# Patient Record
Sex: FEMALE | Race: WHITE | NOT HISPANIC OR LATINO | Employment: OTHER | URBAN - METROPOLITAN AREA
[De-identification: names, ages, dates, MRNs, and addresses within clinical notes are randomized per-mention and may not be internally consistent; named-entity substitution may affect disease eponyms.]

---

## 2018-12-17 ENCOUNTER — HOSPITAL ENCOUNTER (EMERGENCY)
Facility: HOSPITAL | Age: 33
Discharge: HOME | End: 2018-12-18
Attending: EMERGENCY MEDICINE
Payer: COMMERCIAL

## 2018-12-17 VITALS
SYSTOLIC BLOOD PRESSURE: 113 MMHG | WEIGHT: 95 LBS | RESPIRATION RATE: 16 BRPM | DIASTOLIC BLOOD PRESSURE: 54 MMHG | BODY MASS INDEX: 18.65 KG/M2 | HEIGHT: 60 IN | TEMPERATURE: 98 F | HEART RATE: 69 BPM | OXYGEN SATURATION: 100 %

## 2018-12-17 DIAGNOSIS — M54.12 CERVICAL RADICULOPATHY: Primary | ICD-10-CM

## 2018-12-17 LAB
B-HCG UR QL: NEGATIVE
CTP QC/QA: YES

## 2018-12-17 PROCEDURE — 99284 EMERGENCY DEPT VISIT MOD MDM: CPT | Mod: 25

## 2018-12-17 PROCEDURE — 81025 URINE PREGNANCY TEST: CPT | Performed by: NURSE PRACTITIONER

## 2018-12-17 RX ORDER — FLUCONAZOLE 100 MG/1
100 TABLET ORAL DAILY
COMMUNITY
End: 2019-11-26 | Stop reason: ALTCHOICE

## 2018-12-18 RX ORDER — CYCLOBENZAPRINE HCL 10 MG
10 TABLET ORAL 3 TIMES DAILY PRN
Qty: 15 TABLET | Refills: 0 | Status: SHIPPED | OUTPATIENT
Start: 2018-12-18 | End: 2018-12-23

## 2018-12-18 RX ORDER — DICLOFENAC SODIUM 75 MG/1
75 TABLET, DELAYED RELEASE ORAL 2 TIMES DAILY
Qty: 14 TABLET | Refills: 7 | OUTPATIENT
Start: 2018-12-18 | End: 2023-03-27

## 2018-12-18 NOTE — ED PROVIDER NOTES
"Encounter Date: 2018       History     Chief Complaint   Patient presents with    Numbness     Rt face, Neck, and shoulder x 3 weeks.  With assoicated H/A     2018  11:25 PM     Chief Complaint:     The patient is a 33 y.o well appearing female who presents with c/o right facial and neck numbness today.  Reports 2 weeks ago she woke up with right neck pain and has since had intermittent headaches with nausea.  Reports she felt a "knot" on back of right neck a few days ago.  Also c/o intermittent right arm numbness that extends to right hand. Reports occasional dizziness.Denies fever/chills/chest pain/SOB. Denies PMH. PSH includes c section and laproscopic. Denies drug allergies.            Review of patient's allergies indicates:  No Known Allergies  History reviewed. No pertinent past medical history.  Past Surgical History:   Procedure Laterality Date     SECTION       Family History   Problem Relation Age of Onset    Breast cancer Sister     Colon cancer Paternal Uncle     Colon cancer Paternal Grandmother      Social History     Tobacco Use    Smoking status: Current Every Day Smoker     Packs/day: 0.50     Years: 15.00     Pack years: 7.50    Smokeless tobacco: Never Used   Substance Use Topics    Alcohol use: No    Drug use: No     Review of Systems   Constitutional: Negative for chills and fever.   HENT: Negative for ear pain, sinus pain, sore throat, trouble swallowing and voice change.    Eyes: Negative for photophobia, pain and visual disturbance.   Respiratory: Negative for cough, chest tightness and shortness of breath.    Cardiovascular: Negative for chest pain and palpitations.   Gastrointestinal: Positive for nausea. Negative for abdominal pain, diarrhea and vomiting.   Musculoskeletal: Positive for myalgias and neck pain. Negative for back pain, gait problem, joint swelling and neck stiffness.   Skin: Negative for pallor, rash and wound.   Neurological: Positive for " dizziness, weakness, numbness and headaches. Negative for tremors, seizures, syncope, facial asymmetry, speech difficulty and light-headedness.   Hematological: Negative for adenopathy.   Psychiatric/Behavioral: The patient is not nervous/anxious.        Physical Exam     Initial Vitals [12/17/18 2303]   BP Pulse Resp Temp SpO2   (!) 113/54 69 16 98.4 °F (36.9 °C) 100 %      MAP       --         Physical Exam    Constitutional: She appears well-developed and well-nourished. She is not diaphoretic. No distress.   HENT:   Head: Normocephalic and atraumatic.   Left Ear: External ear normal.   Mouth/Throat: Oropharynx is clear and moist. No oropharyngeal exudate.   Eyes: EOM are normal. Pupils are equal, round, and reactive to light.   Neck: Normal range of motion. Neck supple. No tracheal deviation present.   Cardiovascular: Normal rate, regular rhythm, normal heart sounds and intact distal pulses. Exam reveals no gallop and no friction rub.    No murmur heard.  Pulmonary/Chest: Breath sounds normal. No respiratory distress. She has no wheezes. She has no rhonchi. She has no rales. She exhibits no tenderness.   Abdominal: Soft. Bowel sounds are normal.   Musculoskeletal: Normal range of motion. She exhibits no edema or tenderness.   Lymphadenopathy:     She has no cervical adenopathy.   Neurological: She is alert and oriented to person, place, and time. She has normal strength. No cranial nerve deficit or sensory deficit. GCS score is 15. GCS eye subscore is 4. GCS verbal subscore is 5. GCS motor subscore is 6.   Skin: Skin is warm and dry.   Psychiatric: She has a normal mood and affect.         ED Course   Procedures  Labs Reviewed   POCT URINE PREGNANCY          Imaging Results          CT Cervical Spine Without Contrast (In process)                CT Head Without Contrast (In process)                  Medical Decision Making:   History:   Old Medical Records: I decided to obtain old medical records.  Differential  Diagnosis:   Cervical radiculopathy  cva  tia  Trigeminal neuralgia     Clinical Tests:   Lab Tests: Ordered and Reviewed  The following lab test(s) were unremarkable: UPT  Radiological Study: Ordered and Reviewed       APC / Resident Notes:   33 y.o. Well appearing otherwise healthy female presents with c/o right face and neck numbness that has progressively gotten worse over past 2 weeks, reports it started as right sided neck pain. Head CT and Cervical spine CT both negative. The patient appears to have a cervical strain with radiculopathy.  I do not think the patient has a fracture, subluxation, focal unstable neurologic deficit, infectious process or other severe injury requiring further workup in the ED at this time.  The imaging shows no acute injury. I do not feel further workup or treatment is needed at this time and pt is stable for discharge. The patient will be treated with anti-inflammatories, and muscle relaxers. I have discussed pt with Dr Espinal who agrees with POC. Pt voices understanding and is agreeable to the plan.  She is given specific return precautions.                    Clinical Impression:   The encounter diagnosis was Cervical radiculopathy.                             Marcelina Roque NP  12/18/18 0055

## 2019-11-26 ENCOUNTER — OFFICE VISIT (OUTPATIENT)
Dept: URGENT CARE | Facility: CLINIC | Age: 34
End: 2019-11-26
Payer: COMMERCIAL

## 2019-11-26 VITALS
TEMPERATURE: 98 F | RESPIRATION RATE: 18 BRPM | OXYGEN SATURATION: 96 % | SYSTOLIC BLOOD PRESSURE: 93 MMHG | DIASTOLIC BLOOD PRESSURE: 57 MMHG | HEART RATE: 75 BPM | HEIGHT: 60 IN | WEIGHT: 95 LBS | BODY MASS INDEX: 18.65 KG/M2

## 2019-11-26 DIAGNOSIS — B96.89 ACUTE BACTERIAL SINUSITIS: ICD-10-CM

## 2019-11-26 DIAGNOSIS — J06.9 UPPER RESPIRATORY TRACT INFECTION, UNSPECIFIED TYPE: Primary | ICD-10-CM

## 2019-11-26 DIAGNOSIS — J06.9 URI, ACUTE: ICD-10-CM

## 2019-11-26 DIAGNOSIS — Z86.19 HISTORY OF CANDIDIASIS: ICD-10-CM

## 2019-11-26 DIAGNOSIS — J01.90 ACUTE BACTERIAL SINUSITIS: ICD-10-CM

## 2019-11-26 DIAGNOSIS — S13.9XXA CERVICAL SPRAIN, INITIAL ENCOUNTER: ICD-10-CM

## 2019-11-26 LAB
CTP QC/QA: YES
FLUAV AG NPH QL: NEGATIVE
FLUBV AG NPH QL: NEGATIVE

## 2019-11-26 PROCEDURE — 96372 THER/PROPH/DIAG INJ SC/IM: CPT | Mod: S$GLB,,, | Performed by: FAMILY MEDICINE

## 2019-11-26 PROCEDURE — 96372 PR INJECTION,THERAP/PROPH/DIAG2ST, IM OR SUBCUT: ICD-10-PCS | Mod: S$GLB,,, | Performed by: FAMILY MEDICINE

## 2019-11-26 PROCEDURE — 99203 PR OFFICE/OUTPT VISIT, NEW, LEVL III, 30-44 MIN: ICD-10-PCS | Mod: 25,S$GLB,, | Performed by: FAMILY MEDICINE

## 2019-11-26 PROCEDURE — 87804 INFLUENZA ASSAY W/OPTIC: CPT | Mod: QW,S$GLB,, | Performed by: FAMILY MEDICINE

## 2019-11-26 PROCEDURE — 3008F BODY MASS INDEX DOCD: CPT | Mod: CPTII,S$GLB,, | Performed by: FAMILY MEDICINE

## 2019-11-26 PROCEDURE — 99203 OFFICE O/P NEW LOW 30 MIN: CPT | Mod: 25,S$GLB,, | Performed by: FAMILY MEDICINE

## 2019-11-26 PROCEDURE — 87804 POCT INFLUENZA A/B: ICD-10-PCS | Mod: QW,S$GLB,, | Performed by: FAMILY MEDICINE

## 2019-11-26 PROCEDURE — 3008F PR BODY MASS INDEX (BMI) DOCUMENTED: ICD-10-PCS | Mod: CPTII,S$GLB,, | Performed by: FAMILY MEDICINE

## 2019-11-26 RX ORDER — CYCLOBENZAPRINE HCL 5 MG
5 TABLET ORAL NIGHTLY
Qty: 7 TABLET | Refills: 0 | Status: SHIPPED | OUTPATIENT
Start: 2019-11-26 | End: 2019-12-03

## 2019-11-26 RX ORDER — LORATADINE 10 MG/1
10 TABLET ORAL DAILY
Qty: 30 TABLET | Refills: 2 | Status: SHIPPED | OUTPATIENT
Start: 2019-11-26 | End: 2020-11-25

## 2019-11-26 RX ORDER — BETAMETHASONE SODIUM PHOSPHATE AND BETAMETHASONE ACETATE 3; 3 MG/ML; MG/ML
6 INJECTION, SUSPENSION INTRA-ARTICULAR; INTRALESIONAL; INTRAMUSCULAR; SOFT TISSUE
Status: COMPLETED | OUTPATIENT
Start: 2019-11-26 | End: 2019-11-26

## 2019-11-26 RX ORDER — FLUCONAZOLE 150 MG/1
150 TABLET ORAL DAILY
Qty: 1 TABLET | Refills: 0 | Status: SHIPPED | OUTPATIENT
Start: 2019-11-26 | End: 2019-11-27

## 2019-11-26 RX ORDER — FLUTICASONE PROPIONATE 50 MCG
1 SPRAY, SUSPENSION (ML) NASAL DAILY
Qty: 1 BOTTLE | Refills: 0 | OUTPATIENT
Start: 2019-11-26 | End: 2023-03-27

## 2019-11-26 RX ORDER — AMOXICILLIN 875 MG/1
875 TABLET, FILM COATED ORAL 2 TIMES DAILY
Qty: 20 TABLET | Refills: 0 | Status: SHIPPED | OUTPATIENT
Start: 2019-11-29 | End: 2019-12-09

## 2019-11-26 RX ADMIN — BETAMETHASONE SODIUM PHOSPHATE AND BETAMETHASONE ACETATE 6 MG: 3; 3 INJECTION, SUSPENSION INTRA-ARTICULAR; INTRALESIONAL; INTRAMUSCULAR; SOFT TISSUE at 10:11

## 2019-11-26 NOTE — PROGRESS NOTES
Subjective:       Patient ID: Yael Mills is a 34 y.o. female.    Vitals:  height is 5' (1.524 m) and weight is 43.1 kg (95 lb). Her temperature is 98.4 °F (36.9 °C). Her blood pressure is 93/57 (abnormal) and her pulse is 75. Her respiration is 18 and oxygen saturation is 96%.     Chief Complaint: URI    Pt said her symptoms started 2 days ago.  Patient complains of having sore throat sinus congestion for 2 days no fever no chills also complains of left-sided neck pain    URI    This is a new problem. The current episode started in the past 7 days. The problem has been gradually worsening. There has been no fever. Associated symptoms include congestion, coughing, ear pain, headaches and neck pain. Pertinent negatives include no nausea, rash, sinus pain, sore throat, vomiting or wheezing. She has tried acetaminophen for the symptoms. The treatment provided mild relief.       Constitution: Negative for chills, sweating, fatigue and fever.   HENT: Positive for ear pain and congestion. Negative for sinus pain, sinus pressure, sore throat and voice change.    Neck: Positive for neck pain. Negative for painful lymph nodes.   Eyes: Negative for eye redness.   Respiratory: Positive for cough and sputum production. Negative for chest tightness, bloody sputum, COPD, shortness of breath, stridor, wheezing and asthma.    Gastrointestinal: Negative for nausea and vomiting.   Musculoskeletal: Negative for muscle ache.   Skin: Negative for rash.   Allergic/Immunologic: Negative for seasonal allergies and asthma.   Neurological: Positive for headaches.   Hematologic/Lymphatic: Negative for swollen lymph nodes.       Objective:      Physical Exam   Constitutional: She is oriented to person, place, and time. She appears well-developed and well-nourished. She is cooperative.  Non-toxic appearance. She does not appear ill. No distress.   HENT:   Head: Normocephalic and atraumatic.   Right Ear: Hearing, tympanic membrane, external  ear and ear canal normal.   Left Ear: Hearing, tympanic membrane, external ear and ear canal normal.   Nose: Nose normal. No mucosal edema, rhinorrhea or nasal deformity. No epistaxis. Right sinus exhibits no maxillary sinus tenderness and no frontal sinus tenderness. Left sinus exhibits no maxillary sinus tenderness and no frontal sinus tenderness.   Mouth/Throat: Uvula is midline, oropharynx is clear and moist and mucous membranes are normal. No trismus in the jaw. Normal dentition. No uvula swelling. No posterior oropharyngeal erythema.   Throat is clear TMs normal no lymphadenopathy   Eyes: Conjunctivae and lids are normal. Right eye exhibits no discharge. Left eye exhibits no discharge. No scleral icterus.   Neck: Trachea normal, normal range of motion, full passive range of motion without pain and phonation normal. Neck supple.   Left trapezius muscle slight tenderness otherwise slight decreased restricted neck movement laterally to the left   Cardiovascular: Normal rate, regular rhythm, normal heart sounds, intact distal pulses and normal pulses.   Pulmonary/Chest: Effort normal and breath sounds normal. No respiratory distress.   Abdominal: Soft. Normal appearance and bowel sounds are normal. She exhibits no distension, no pulsatile midline mass and no mass. There is no tenderness.   Musculoskeletal: Normal range of motion. She exhibits no edema or deformity.   Neurological: She is alert and oriented to person, place, and time. She exhibits normal muscle tone. Coordination normal.   Skin: Skin is warm, dry, intact, not diaphoretic and not pale.   Psychiatric: She has a normal mood and affect. Her speech is normal and behavior is normal. Judgment and thought content normal. Cognition and memory are normal.   Nursing note and vitals reviewed.        Assessment:       1. Upper respiratory tract infection, unspecified type    2. URI, acute    3. Cervical sprain, initial encounter    4. Acute bacterial sinusitis     5. History of candidiasis        Plan:       Patient Instructions   Please drink plenty of fluids.  Please get plenty of rest.  Please return here or go to the Emergency Department for any concerns or worsening of condition.     If you were given wait & see antibiotics, please wait 5-7 days before taking them, and only take them if your symptoms have worsened or not improved.  If you do begin taking the antibiotics, please take them to completion.    Upper respiratory tract infection, unspecified type  -     POCT Influenza A/B    URI, acute    Cervical sprain, initial encounter  -     cyclobenzaprine (FLEXERIL) 5 MG tablet; Take 1 tablet (5 mg total) by mouth nightly. for 7 doses  Dispense: 7 tablet; Refill: 0    Acute bacterial sinusitis  -     fluticasone propionate (FLONASE) 50 mcg/actuation nasal spray; 1 spray (50 mcg total) by Each Nostril route once daily.  Dispense: 1 Bottle; Refill: 0  -     amoxicillin (AMOXIL) 875 MG tablet; Take 1 tablet (875 mg total) by mouth 2 (two) times daily. for 10 days  Dispense: 20 tablet; Refill: 0    History of candidiasis  -     fluconazole (DIFLUCAN) 150 MG Tab; Take 1 tablet (150 mg total) by mouth once daily. for 1 day  Dispense: 1 tablet; Refill: 0    Other orders  -     loratadine (CLARITIN) 10 mg tablet; Take 1 tablet (10 mg total) by mouth once daily.  Dispense: 30 tablet; Refill: 2  -     betamethasone acetate-betamethasone sodium phosphate injection 6 mg        after the exam patient was told that she has allergies at at that she got very displeased and started saying that vinegar treat her for her sinus infection and this is not allergies and she never had allergies before even I finished telling her that what she is goi ng to be treated with    Pt was advised of this, was not happy, wanting ABX    Results for orders placed or performed in visit on 11/26/19   POCT Influenza A/B   Result Value Ref Range    Rapid Influenza A Ag Negative Negative    Rapid Influenza B  Ag Negative Negative     Acceptable Yes

## 2020-04-02 ENCOUNTER — NURSE TRIAGE (OUTPATIENT)
Dept: ADMINISTRATIVE | Facility: CLINIC | Age: 35
End: 2020-04-02

## 2020-04-02 NOTE — TELEPHONE ENCOUNTER
Low grade temp. Highest 100.3. OAC info provided.    Reason for Disposition   Sore throat    Additional Information   Negative: Difficulty breathing (per caller) but not severe   Negative: Fever > 103 F (39.4 C)   Negative: Refuses to drink anything for > 12 hours   Negative: Drooling or spitting out saliva (because can't swallow)   Negative: Unable to open mouth completely   Negative: Drinking very little and has signs of dehydration (e.g., no urine > 12 hours, very dry mouth, very lightheaded)   Negative: Patient sounds very sick or weak to the triager   Negative: Throat culture results, call about   Negative: Productive cough is the main symptom   Negative: Runny nose is the main symptom   Negative: Severe difficulty breathing (e.g., struggling for each breath, speaks in single words)   Negative: Sounds like a life-threatening emergency to the triager   Negative: Patient wants to be seen   Negative: SEVERE sore throat pain   Negative: Pus on tonsils (back of throat) and swollen neck lymph nodes ('glands')   Negative: Earache also present   Negative: Widespread rash (especially chest and abdomen)   Negative: Diabetes mellitus or weak immune system (e.g., HIV positive, cancer chemo, splenectomy, organ transplant, chronic steroids)   Negative: History of rheumatic fever   Negative: Patient requesting a strep throat test   Negative: Strep exposure within last 10 days   Negative: Sore throat is the main symptom and persists > 48 hours   Negative: Sore throat with cough/cold symptoms present > 5 days   Negative: Fever present > 3 days (72 hours)    Protocols used: SORE THROAT-A-OH

## 2020-07-05 ENCOUNTER — HOSPITAL ENCOUNTER (EMERGENCY)
Facility: HOSPITAL | Age: 35
Discharge: HOME | End: 2020-07-05
Attending: EMERGENCY MEDICINE
Payer: COMMERCIAL

## 2020-07-05 VITALS
RESPIRATION RATE: 20 BRPM | OXYGEN SATURATION: 100 % | DIASTOLIC BLOOD PRESSURE: 76 MMHG | HEART RATE: 80 BPM | SYSTOLIC BLOOD PRESSURE: 112 MMHG | TEMPERATURE: 99 F

## 2020-07-05 DIAGNOSIS — Z20.822 SUSPECTED COVID-19 VIRUS INFECTION: Primary | ICD-10-CM

## 2020-07-05 PROCEDURE — U0003 INFECTIOUS AGENT DETECTION BY NUCLEIC ACID (DNA OR RNA); SEVERE ACUTE RESPIRATORY SYNDROME CORONAVIRUS 2 (SARS-COV-2) (CORONAVIRUS DISEASE [COVID-19]), AMPLIFIED PROBE TECHNIQUE, MAKING USE OF HIGH THROUGHPUT TECHNOLOGIES AS DESCRIBED BY CMS-2020-01-R: HCPCS

## 2020-07-05 PROCEDURE — 99283 EMERGENCY DEPT VISIT LOW MDM: CPT

## 2020-07-05 RX ORDER — ALBUTEROL SULFATE 90 UG/1
1-2 AEROSOL, METERED RESPIRATORY (INHALATION) EVERY 6 HOURS PRN
Qty: 18 G | Refills: 0 | Status: SHIPPED | OUTPATIENT
Start: 2020-07-05 | End: 2021-07-31 | Stop reason: SDUPTHER

## 2020-07-05 RX ORDER — ALBUTEROL SULFATE 90 UG/1
1-2 AEROSOL, METERED RESPIRATORY (INHALATION) EVERY 6 HOURS PRN
Qty: 18 G | Refills: 0 | Status: SHIPPED | OUTPATIENT
Start: 2020-07-05 | End: 2021-07-05

## 2020-07-05 NOTE — ED PROVIDER NOTES
Encounter Date: 2020       History     Chief Complaint   Patient presents with    COVID-19 Concerns     son + for COVID 2 days ago; reports cough, fatigue, sore throat; denies fever     Patient presents the emergency room for evaluation of mild cough fatigue sore throat.  Her son is positive for COVID 2 days ago.  Her son's father and family members were positive for coronavirus.  No other complaints at this time she is a smoker.        Review of patient's allergies indicates:  No Known Allergies  No past medical history on file.  Past Surgical History:   Procedure Laterality Date     SECTION       Family History   Problem Relation Age of Onset    Breast cancer Sister     Colon cancer Paternal Uncle     Colon cancer Paternal Grandmother      Social History     Tobacco Use    Smoking status: Current Every Day Smoker     Packs/day: 0.50     Years: 15.00     Pack years: 7.50    Smokeless tobacco: Never Used   Substance Use Topics    Alcohol use: No    Drug use: No     Review of Systems   Constitutional: Negative for fever.   HENT: Positive for sore throat. Negative for ear pain and rhinorrhea.    Eyes: Negative for pain and visual disturbance.   Respiratory: Positive for cough, shortness of breath and wheezing.    Cardiovascular: Negative for chest pain.   Gastrointestinal: Negative for abdominal pain, diarrhea, nausea and vomiting.   Genitourinary: Negative for difficulty urinating and dysuria.   Musculoskeletal: Negative for arthralgias.   Skin: Negative for rash.   Neurological: Negative for weakness, light-headedness, numbness and headaches.   Psychiatric/Behavioral: Negative for agitation and confusion.   All other systems reviewed and are negative.      Physical Exam     Initial Vitals [20 1522]   BP Pulse Resp Temp SpO2   98/61 76 20 99 °F (37.2 °C) 100 %      MAP       --         Physical Exam    Nursing note and vitals reviewed.  Constitutional: She appears well-developed and  well-nourished.  Non-toxic appearance. No distress.   Nontoxic, well appearing female.    HENT:   Head: Normocephalic and atraumatic.   Mouth/Throat: Oropharynx is clear and moist.   Eyes: EOM are normal. Pupils are equal, round, and reactive to light.   Neck: Neck supple.   Cardiovascular: Normal rate, regular rhythm, normal heart sounds and intact distal pulses. Exam reveals no gallop and no friction rub.    No murmur heard.  Pulmonary/Chest: No respiratory distress. She has no decreased breath sounds. She has wheezes. She has no rhonchi. She has no rales.   Abdominal: Soft. Bowel sounds are normal. She exhibits no distension. There is no abdominal tenderness.   Musculoskeletal: Normal range of motion.   Neurological: She is alert and oriented to person, place, and time. She has normal strength. No cranial nerve deficit. GCS score is 15. GCS eye subscore is 4. GCS verbal subscore is 5. GCS motor subscore is 6.   Skin: Skin is warm and dry.   Psychiatric: She has a normal mood and affect.         ED Course   Procedures  Labs Reviewed   SARS-COV-2 (COVID-19) QUALITATIVE PCR          Imaging Results    None          Medical Decision Making:   Clinical Tests:   Lab Tests: Reviewed  Patient will be tested for coronavirus.  She does not appear to be significantly ill.  I do not think she needs lab work admission at this time.  She is slightly wheezing and will give albuterol.  For discharge.                                 Clinical Impression:       ICD-10-CM ICD-9-CM   1. Suspected Covid-19 Virus Infection  R68.89                                 Andre Diaz MD  07/05/20 2026

## 2020-07-05 NOTE — Clinical Note
"Yael"Gabriela Mills was seen and treated in our emergency department on 7/5/2020.     COVID-19 is present in our communities across the state. There is limited testing for COVID at this time, so not all patients can be tested. In this situation, your employee meets the following criteria:    Yael Mills has met the criteria for COVID-19 testing based upon symptoms, travel, and/or potential exposure. The test has been completed and is pending results at this time. During this time the employee is not able to work and should be quarantined per the Centers for Disease Control timelines.     If you have any questions or concerns, or if I can be of further assistance, please do not hesitate to contact me.    Sincerely,             Andre Diaz MD"

## 2020-07-05 NOTE — ED NOTES
Assumed care:  Yael Mills is awake, alert and oriented x 3, skin warm and dry, in NAD.  Patient states that her children, , inlaws all tested positive for Covid.  Patient states that she started with dry cough and body aches yesterday.    Patient identifiers for Yael Mills checked and correct.  LOC:  Yael Mills is awake, alert, and aware of environment with an appropriate affect. She is oriented x 3 and speaking appropriately.  APPEARANCE:  She is resting comfortably and in no acute distress. She is clean and well groomed, patient's clothing is properly fastened.  SKIN:  The skin is warm and dry. She has normal skin turgor and moist mucus membranes. Skin is intact; no bruising or breakdown noted.  MUSCULOSKELETAL:  She is moving all extremities well, no obvious deformities noted. Pulses intact.   RESPIRATORY:  Airway is open and patent. Respirations are spontaneous and non-labored with normal effort and rate.  Dry cough  CARDIAC:  She has a normal rate and rhythm. No peripheral edema noted. Capillary refill < 3 seconds.  ABDOMEN:  No distention noted.  Soft and non-tender upon palpation.  NEUROLOGICAL:  PERRL. Facial expression is symmetrical. Hand grasps are equal bilaterally. Normal sensation in all extremities when touched with finger.  Allergies reported:  Review of patient's allergies indicates:  No Known Allergies  OTHER NOTES:

## 2020-07-06 LAB — SARS-COV-2 RNA RESP QL NAA+PROBE: NOT DETECTED

## 2021-04-26 ENCOUNTER — PATIENT MESSAGE (OUTPATIENT)
Dept: RESEARCH | Facility: HOSPITAL | Age: 36
End: 2021-04-26

## 2021-07-31 ENCOUNTER — HOSPITAL ENCOUNTER (EMERGENCY)
Facility: HOSPITAL | Age: 36
Discharge: HOME | End: 2021-07-31
Attending: EMERGENCY MEDICINE
Payer: COMMERCIAL

## 2021-07-31 VITALS
TEMPERATURE: 99 F | DIASTOLIC BLOOD PRESSURE: 63 MMHG | BODY MASS INDEX: 23.18 KG/M2 | RESPIRATION RATE: 18 BRPM | OXYGEN SATURATION: 100 % | WEIGHT: 115 LBS | HEIGHT: 59 IN | HEART RATE: 72 BPM | SYSTOLIC BLOOD PRESSURE: 102 MMHG

## 2021-07-31 DIAGNOSIS — Z20.822 LAB TEST NEGATIVE FOR COVID-19 VIRUS: ICD-10-CM

## 2021-07-31 DIAGNOSIS — J06.9 VIRAL URI WITH COUGH: Primary | ICD-10-CM

## 2021-07-31 LAB — SARS-COV-2 RDRP RESP QL NAA+PROBE: NEGATIVE

## 2021-07-31 PROCEDURE — 99283 EMERGENCY DEPT VISIT LOW MDM: CPT

## 2021-07-31 PROCEDURE — U0002 COVID-19 LAB TEST NON-CDC: HCPCS | Performed by: PHYSICIAN ASSISTANT

## 2021-07-31 RX ORDER — ALBUTEROL SULFATE 90 UG/1
1-2 AEROSOL, METERED RESPIRATORY (INHALATION) EVERY 6 HOURS PRN
Qty: 18 G | Refills: 0 | Status: SHIPPED | OUTPATIENT
Start: 2021-07-31 | End: 2022-07-31

## 2022-04-01 ENCOUNTER — HOSPITAL ENCOUNTER (EMERGENCY)
Facility: HOSPITAL | Age: 37
Discharge: HOME | End: 2022-04-01
Attending: EMERGENCY MEDICINE
Payer: COMMERCIAL

## 2022-04-01 VITALS
TEMPERATURE: 98 F | BODY MASS INDEX: 22.97 KG/M2 | SYSTOLIC BLOOD PRESSURE: 116 MMHG | HEART RATE: 83 BPM | HEIGHT: 60 IN | OXYGEN SATURATION: 99 % | DIASTOLIC BLOOD PRESSURE: 69 MMHG | RESPIRATION RATE: 18 BRPM | WEIGHT: 117 LBS

## 2022-04-01 DIAGNOSIS — R07.89 XIPHOID PAIN: ICD-10-CM

## 2022-04-01 DIAGNOSIS — R07.89 CHEST WALL PAIN: ICD-10-CM

## 2022-04-01 PROCEDURE — 93005 ELECTROCARDIOGRAM TRACING: CPT

## 2022-04-01 PROCEDURE — 99284 EMERGENCY DEPT VISIT MOD MDM: CPT | Mod: 25

## 2022-04-01 PROCEDURE — 25000003 PHARM REV CODE 250: Performed by: EMERGENCY MEDICINE

## 2022-04-01 PROCEDURE — 93010 EKG 12-LEAD: ICD-10-PCS | Mod: ,,, | Performed by: GENERAL PRACTICE

## 2022-04-01 PROCEDURE — 93010 ELECTROCARDIOGRAM REPORT: CPT | Mod: ,,, | Performed by: GENERAL PRACTICE

## 2022-04-01 RX ORDER — ACETAMINOPHEN 500 MG
1000 TABLET ORAL
Status: COMPLETED | OUTPATIENT
Start: 2022-04-01 | End: 2022-04-01

## 2022-04-01 RX ORDER — IBUPROFEN 400 MG/1
800 TABLET ORAL
Status: COMPLETED | OUTPATIENT
Start: 2022-04-01 | End: 2022-04-01

## 2022-04-01 RX ADMIN — ACETAMINOPHEN 1000 MG: 500 TABLET ORAL at 02:04

## 2022-04-01 RX ADMIN — IBUPROFEN 800 MG: 400 TABLET, FILM COATED ORAL at 02:04

## 2022-04-01 NOTE — FIRST PROVIDER EVALUATION
Emergency Department TeleTriage Encounter Note      CHIEF COMPLAINT    Chief Complaint   Patient presents with    Lump to chest     Pt to ER with c/o lump to epigastric area x 1 week. Pt reports pain to area radiating up towards chest. + weakness        VITAL SIGNS   Initial Vitals [04/01/22 1252]   BP Pulse Resp Temp SpO2   113/72 73 18 98.4 °F (36.9 °C) 100 %      MAP       --            ALLERGIES    Review of patient's allergies indicates:  No Known Allergies    PROVIDER TRIAGE NOTE  This is a teletriage evaluation of a 36 y.o. female presenting to the ED with c/o lump to lower sternum for the past week. Pt states today she started with chest pain and back pain.  1 episode of vomiting earlier today.  Unsure if she is pregnant.   .     PE: Nontender lump to chest.  Burning around site.  VSS.  NAD noted.  Speaking in full sentences    Plan: imaging, urine    All ED beds are full at present; patient notified of this status.  Patient seen and medically screened by Nurse Practitioner via teletriage. Orders initiated at triage to expedite care.  Patient is stable and will be placed in an ED bed when available.  Care will be transferred to an alternate provider when patient has been placed in an Exam Room further exam, additional orders, and disposition.          ORDERS  Labs Reviewed   PREGNANCY TEST, URINE RAPID       ED Orders (720h ago, onward)    Start Ordered     Status Ordering Provider    04/01/22 1259 04/01/22 1258  Pregnancy, urine rapid  STAT         Ordered REYNA JIMENEZ    04/01/22 1258 04/01/22 1258  X-Ray Chest 1 View  1 time imaging         Ordered REYNA JIMENEZ            Virtual Visit Note: The provider triage portion of this emergency department evaluation and documentation was performed via [employer], a HIPAA-compliant telemedicine application, in concert with a tele-presenter in the room. A face to face patient evaluation with one of my colleagues will occur once the patient is placed in  an emergency department room.      DISCLAIMER: This note was prepared with [employer] voice recognition transcription software. Garbled syntax, mangled pronouns, and other bizarre constructions may be attributed to that software system.

## 2022-04-01 NOTE — ED PROVIDER NOTES
Encounter Date: 2022    SCRIBE #1 NOTE: I, Shauna Soni, am scribing for, and in the presence of, Orlin Anne MD.       History     Chief Complaint   Patient presents with    Lump to chest     Pt to ER with c/o lump to epigastric area x 1 week. Pt reports pain to area radiating up towards chest. + weakness      Time seen by provider: 2:34 PM on 2022    Yael Mills is a 36 y.o. female who presents to the ED with an onset of a painful lump to her mid upper abdomen that she noticed last Thursday (3/24) with some increased pain today that became worse after eating. She reports after eating she started having some burning in her chest. She also endorses vomiting once this morning. She states she can feel the lump when she breathes. She denies any major health problems or daily medications. The patient denies any other symptoms at this time. PSHx of breast implants and .      The history is provided by the patient.     Review of patient's allergies indicates:  No Known Allergies  No past medical history on file.  Past Surgical History:   Procedure Laterality Date     SECTION       Family History   Problem Relation Age of Onset    Breast cancer Sister     Colon cancer Paternal Uncle     Colon cancer Paternal Grandmother      Social History     Tobacco Use    Smoking status: Current Every Day Smoker     Packs/day: 0.50     Years: 15.00     Pack years: 7.50    Smokeless tobacco: Never Used   Substance Use Topics    Alcohol use: No    Drug use: No     Review of Systems   Constitutional: Negative.    Respiratory: Negative for shortness of breath.    Cardiovascular: Positive for chest pain.   Gastrointestinal: Positive for abdominal pain and vomiting.       Physical Exam     Initial Vitals [22 1252]   BP Pulse Resp Temp SpO2   113/72 73 18 98.4 °F (36.9 °C) 100 %      MAP       --         Physical Exam    Nursing note and vitals reviewed.  Constitutional: She appears  well-developed and well-nourished.   HENT:   Head: Normocephalic and atraumatic.   Eyes: EOM are normal.   Neck: Neck supple.   Normal range of motion.  Cardiovascular: Normal rate, regular rhythm and normal heart sounds. Exam reveals no gallop and no friction rub.    No murmur heard.  Pulmonary/Chest: Breath sounds normal. No respiratory distress. She has no wheezes. She has no rhonchi. She has no rales.   Point tenderness to the xyphoid.   Musculoskeletal:         General: Normal range of motion.      Cervical back: Normal range of motion and neck supple.     Neurological: She is alert and oriented to person, place, and time.   Skin: Skin is warm and dry.   Psychiatric: She has a normal mood and affect. Her behavior is normal. Judgment and thought content normal.         ED Course   Procedures  Labs Reviewed   PREGNANCY TEST, URINE RAPID          Imaging Results          X-Ray Chest 1 View (Final result)  Result time 04/01/22 13:52:04    Final result by Ivon Corbett MD (04/01/22 13:52:04)                 Impression:      No acute cardiopulmonary disease.      Electronically signed by: Ivon Corbett MD  Date:    04/01/2022  Time:    13:52             Narrative:    EXAMINATION:  XR CHEST 1 VIEW    CLINICAL HISTORY:  Other chest pain    TECHNIQUE:  Single frontal view of the chest was performed.    COMPARISON:  None    FINDINGS:  The heart is not enlarged.  The lungs are expanded and clear.  The costophrenic sulci are sharp.  The bony thorax to the extent visualized on the routine single view of the chest appears intact.                                 Medications   ibuprofen tablet 800 mg (800 mg Oral Given 4/1/22 1455)   acetaminophen tablet 1,000 mg (1,000 mg Oral Given 4/1/22 1455)     Medical Decision Making:   History:   Old Medical Records: I decided to obtain old medical records.  Clinical Tests:   Lab Tests: Ordered and Reviewed  Radiological Study: Ordered and Reviewed          Scribe Attestation:  "  Scribe #1: I performed the above scribed service and the documentation accurately describes the services I performed. I attest to the accuracy of the note.        ED Course as of 04/01/22 1642 Fri Apr 01, 2022   1409 X-Ray Chest 1 View [EF]   1409 SpO2: 100 % [EF]   1409 Resp: 18 [EF]   1409 Pulse: 73 [EF]   1409 Temp src: Oral [EF]   1409 Temp: 98.4 °F (36.9 °C) [EF]   1409 BP: 113/72 [EF]   1504 EKG sinus rhythm 71 beats per minute normal axis no ST elevation or depression no T-wave inversion independently interpreted [EF]   1532 RN reports patient eloped ER without informing staff [EF]      ED Course User Index  [EF] Orlin Anne MD           I, Dr. Anne, personally performed the services described in this documentation. All medical record entries made by the scribe were at my direction and in my presence.  I have reviewed the chart and agree that the record reflects my personal performance and is accurate and complete.4:42 PM 04/01/2022      Clinical Impression:   Final diagnoses:  [R07.89] Chest wall pain  [R07.89] Xiphoid pain          ED Disposition Condition    Eloped               36-year-old presents to the ER with "a lump" in her upper abdomen.  Patient reports symptoms for several days.  Triage note reviewed which states radiation to the chest and general weakness but patient denies this to physician.  Presentation is consistent with xiphodynia, eloped ER after initial exam before I could go back and speak with her.      Orlin Anne MD  04/01/22 9952    "

## 2022-08-07 ENCOUNTER — HOSPITAL ENCOUNTER (EMERGENCY)
Facility: HOSPITAL | Age: 37
Discharge: HOME | End: 2022-08-07
Attending: EMERGENCY MEDICINE
Payer: COMMERCIAL

## 2022-08-07 VITALS
HEIGHT: 60 IN | RESPIRATION RATE: 18 BRPM | BODY MASS INDEX: 24.54 KG/M2 | OXYGEN SATURATION: 98 % | HEART RATE: 76 BPM | SYSTOLIC BLOOD PRESSURE: 106 MMHG | DIASTOLIC BLOOD PRESSURE: 64 MMHG | TEMPERATURE: 98 F | WEIGHT: 125 LBS

## 2022-08-07 DIAGNOSIS — M25.539 WRIST PAIN: ICD-10-CM

## 2022-08-07 DIAGNOSIS — S60.221A CONTUSION OF RIGHT HAND, INITIAL ENCOUNTER: Primary | ICD-10-CM

## 2022-08-07 PROCEDURE — 99284 EMERGENCY DEPT VISIT MOD MDM: CPT | Mod: 25

## 2022-08-08 NOTE — ED PROVIDER NOTES
Encounter Date: 2022       History     Chief Complaint   Patient presents with    Hand Injury     R hand after getting it smashed in the door     36-year-old female with no past medical history presents with a chief complaint of right hand/wrist pain.  The patient reports that she accidentally slammed her hand in a door at home approximately 2 hours prior to arrival.  She reports worsening pain with movement of her fingers.  She reports the pain radiates up into her wrist.  There are no alleviating factors.  She denies any other trauma.  She is right-hand dominant.        Review of patient's allergies indicates:   Allergen Reactions    Milk containing products      No past medical history on file.  Past Surgical History:   Procedure Laterality Date     SECTION       Family History   Problem Relation Age of Onset    Breast cancer Sister     Colon cancer Paternal Uncle     Colon cancer Paternal Grandmother      Social History     Tobacco Use    Smoking status: Current Every Day Smoker     Packs/day: 0.50     Years: 15.00     Pack years: 7.50    Smokeless tobacco: Never Used   Substance Use Topics    Alcohol use: No    Drug use: No     Review of Systems   Constitutional: Negative for chills and fever.   Respiratory: Negative for shortness of breath.    Cardiovascular: Negative for chest pain.   Gastrointestinal: Negative for abdominal pain, nausea and vomiting.   Genitourinary: Negative for dysuria.   Musculoskeletal: Positive for arthralgias. Negative for back pain.   Skin: Negative for color change.   Psychiatric/Behavioral: Negative for confusion.       Physical Exam     Initial Vitals [22]   BP Pulse Resp Temp SpO2   106/64 85 18 98 °F (36.7 °C) 98 %      MAP       --         Physical Exam    Nursing note and vitals reviewed.  Constitutional: She appears well-developed and well-nourished. She is not diaphoretic. No distress.   HENT:   Head: Normocephalic and atraumatic.   Right Ear:  External ear normal.   Left Ear: External ear normal.   Nose: Nose normal.   Eyes: Conjunctivae and EOM are normal. Pupils are equal, round, and reactive to light.   Neck: Neck supple.   Normal range of motion.  Musculoskeletal:         General: Tenderness present. No edema.      Cervical back: Normal range of motion and neck supple.      Comments: Tenderness along the lateral right hand.  Good range of motion noted to the right fingers without rotational deformity noted.     Neurological: She is alert and oriented to person, place, and time. She has normal strength. GCS score is 15. GCS eye subscore is 4. GCS verbal subscore is 5. GCS motor subscore is 6.   Skin: Skin is warm and dry.   Psychiatric: She has a normal mood and affect. Her behavior is normal. Judgment and thought content normal.         ED Course   Procedures  Labs Reviewed - No data to display       Imaging Results          X-Ray Wrist Complete Right (Final result)  Result time 08/07/22 21:59:15    Final result by Corinna Lee MD (08/07/22 21:59:15)                 Impression:      No acute fracture or dislocation involving the right wrist right hand.  No radiopaque foreign bodies.      Electronically signed by: Corinna Lee  Date:    08/07/2022  Time:    21:59             Narrative:    EXAMINATION:  XR WRIST COMPLETE 3 VIEWS RIGHT; XR HAND COMPLETE 3 VIEW RIGHT    CLINICAL HISTORY:  hand injury; Pain in unspecified wrist    TECHNIQUE:  PA, lateral, and oblique views of the right wrist and right hand were performed.    COMPARISON:  None    FINDINGS:  There is no evidence of acute fracture or dislocation involving the right hand or the right wrist.  Incidental note is made of a cystic lucency with well-defined sclerotic margins in the distal 3rd/middle metacarpal.  Osseous alignment and joint spaces of the right wrist and hand are well maintained.  Soft tissues appear intact with no evidence of radiopaque foreign bodies.                                X-Ray Hand 3 View Right (Final result)  Result time 08/07/22 21:59:15    Final result by Corinna Lee MD (08/07/22 21:59:15)                 Impression:      No acute fracture or dislocation involving the right wrist right hand.  No radiopaque foreign bodies.      Electronically signed by: Corinna Lee  Date:    08/07/2022  Time:    21:59             Narrative:    EXAMINATION:  XR WRIST COMPLETE 3 VIEWS RIGHT; XR HAND COMPLETE 3 VIEW RIGHT    CLINICAL HISTORY:  hand injury; Pain in unspecified wrist    TECHNIQUE:  PA, lateral, and oblique views of the right wrist and right hand were performed.    COMPARISON:  None    FINDINGS:  There is no evidence of acute fracture or dislocation involving the right hand or the right wrist.  Incidental note is made of a cystic lucency with well-defined sclerotic margins in the distal 3rd/middle metacarpal.  Osseous alignment and joint spaces of the right wrist and hand are well maintained.  Soft tissues appear intact with no evidence of radiopaque foreign bodies.                                 Medications - No data to display  Medical Decision Making:   Initial Assessment:   36-year-old female presents with a hand/wrist injury.  Differential Diagnosis:   Initial differential diagnosis included but not limited to fracture, dislocation, and contusion.  Clinical Tests:   Radiological Study: Ordered and Reviewed  ED Management:  The patient was emergently evaluated in the emergency department, her evaluation was significant for a young female with an injury to the hand.  The patient's x-ray showed no acute bony abnormalities per Radiology.  The patient likely has a contusion to the hand.  The patient was treated with an Ace wrap for support and comfort.  She is stable for discharge to home.  She will be discharged to take over-the-counter pain medication as needed for pain relief.  She is referred to primary care for follow-up.                      Clinical  Impression:   Final diagnoses:  [M25.539] Wrist pain  [S60.221A] Contusion of right hand, initial encounter (Primary)          ED Disposition Condition    Discharge Stable        ED Prescriptions     None        Follow-up Information     Follow up With Specialties Details Why Contact Info    Charlie Conway MD Obstetrics Schedule an appointment as soon as possible for a visit   [address]   [place] [address]  [phone]             Donald Coats MD  08/07/22 7877

## 2022-10-06 ENCOUNTER — TELEPHONE (OUTPATIENT)
Dept: ORTHOPEDICS | Facility: CLINIC | Age: 37
End: 2022-10-06
Payer: COMMERCIAL

## 2023-03-27 ENCOUNTER — HOSPITAL ENCOUNTER (EMERGENCY)
Facility: HOSPITAL | Age: 38
Discharge: HOME | End: 2023-03-27
Attending: EMERGENCY MEDICINE
Payer: COMMERCIAL

## 2023-03-27 VITALS
RESPIRATION RATE: 18 BRPM | OXYGEN SATURATION: 100 % | BODY MASS INDEX: 24.54 KG/M2 | TEMPERATURE: 98 F | DIASTOLIC BLOOD PRESSURE: 81 MMHG | WEIGHT: 125 LBS | SYSTOLIC BLOOD PRESSURE: 119 MMHG | HEART RATE: 80 BPM | HEIGHT: 60 IN

## 2023-03-27 DIAGNOSIS — R07.9 CHEST PAIN: ICD-10-CM

## 2023-03-27 DIAGNOSIS — S22.42XA CLOSED FRACTURE OF MULTIPLE RIBS OF LEFT SIDE, INITIAL ENCOUNTER: Primary | ICD-10-CM

## 2023-03-27 PROCEDURE — 63600175 PHARM REV CODE 636 W HCPCS: Performed by: EMERGENCY MEDICINE

## 2023-03-27 PROCEDURE — 99284 EMERGENCY DEPT VISIT MOD MDM: CPT

## 2023-03-27 PROCEDURE — 96372 THER/PROPH/DIAG INJ SC/IM: CPT | Performed by: EMERGENCY MEDICINE

## 2023-03-27 RX ORDER — KETOROLAC TROMETHAMINE 30 MG/ML
30 INJECTION, SOLUTION INTRAMUSCULAR; INTRAVENOUS
Status: COMPLETED | OUTPATIENT
Start: 2023-03-27 | End: 2023-03-27

## 2023-03-27 RX ORDER — OXYCODONE AND ACETAMINOPHEN 5; 325 MG/1; MG/1
1-2 TABLET ORAL EVERY 4 HOURS PRN
Qty: 20 TABLET | Refills: 0 | Status: SHIPPED | OUTPATIENT
Start: 2023-03-27

## 2023-03-27 RX ADMIN — KETOROLAC TROMETHAMINE 30 MG: 30 INJECTION, SOLUTION INTRAMUSCULAR; INTRAVENOUS at 10:03

## 2023-03-27 NOTE — Clinical Note
"Yael Cortez" Gene was seen and treated in our emergency department on 3/27/2023.  She may return to work on 03/29/2023.       If you have any questions or concerns, please don't hesitate to call.      TO Oden, RN RN    "

## 2023-03-28 NOTE — ED PROVIDER NOTES
"Encounter Date: 3/27/2023    SCRIBE #1 NOTE: I, Ashwin Desai, am scribing for, and in the presence of,  Wilver Espinal III, MD.     History     Chief Complaint   Patient presents with    Rib Injury     L side rib pain     Time seen by provider: 9:31 PM on 2023    Yael Mills is a 37 y.o. female who presents to the ED with a rib injury. The patient reports working as a  as her second job and bumping her back into something while moving crawfish. She states that she went into work today when she went to push the freezer door close when she heard her ribs crack. Per the patient, she was unable to breathe or walk. She took half of a 5 mg percocet given to her by her friend for the pain, but the patient still describes the pain as "excruciating." The patient denies any other symptoms at this time. No pertinent PMHx. No pertinent PSHx.    The history is provided by the patient.   Review of patient's allergies indicates:   Allergen Reactions    Milk containing products      No past medical history on file.  Past Surgical History:   Procedure Laterality Date     SECTION       Family History   Problem Relation Age of Onset    Breast cancer Sister     Colon cancer Paternal Uncle     Colon cancer Paternal Grandmother      Social History     Tobacco Use    Smoking status: Every Day     Packs/day: 0.50     Years: 15.00     Pack years: 7.50     Types: Cigarettes    Smokeless tobacco: Never   Substance Use Topics    Alcohol use: No    Drug use: No     Review of Systems   Constitutional:  Negative for fever.   Respiratory:  Negative for shortness of breath.    Genitourinary:  Negative for flank pain.   Musculoskeletal:  Positive for myalgias. Negative for gait problem.   Neurological:  Negative for weakness.   Psychiatric/Behavioral:  Negative for confusion.      Physical Exam     Initial Vitals [23]   BP Pulse Resp Temp SpO2   119/81 80 18 98.1 °F (36.7 °C) 100 %      MAP       --     "     Physical Exam    Nursing note and vitals reviewed.  Constitutional: She appears well-developed and well-nourished.   HENT:   Head: Normocephalic and atraumatic.   Eyes: Conjunctivae are normal.   Neck: Neck supple.   Normal range of motion.  Cardiovascular:  Normal rate, regular rhythm and normal heart sounds.     Exam reveals no gallop and no friction rub.       No murmur heard.  Pulmonary/Chest: Effort normal and breath sounds normal. No respiratory distress. She has no wheezes. She has no rhonchi. She has no rales. She exhibits bony tenderness. She exhibits no crepitus.   Left posterior chest wall tenderness noted.   Abdominal: Abdomen is soft. She exhibits no distension. There is no abdominal tenderness.   Musculoskeletal:         General: Normal range of motion.      Cervical back: Normal range of motion and neck supple.     Neurological: She is alert and oriented to person, place, and time.   Skin: Skin is warm and dry. No erythema.   Psychiatric: She has a normal mood and affect.       ED Course   Procedures  Labs Reviewed - No data to display       Imaging Results              X-Ray Ribs 2 View Left (Final result)  Result time 03/27/23 22:07:51      Final result by Justin Vasques DO (03/27/23 22:07:51)                   Impression:      No rib fracture.      Electronically signed by: Justin Vasques  Date:    03/27/2023  Time:    22:07               Narrative:    EXAMINATION:  XR RIBS 2 VIEW LEFT    CLINICAL HISTORY:  Chest pain, unspecified    TECHNIQUE:  Two views of the left ribs were performed.    COMPARISON:  04/01/2022.    FINDINGS:  There is no evidence of an acute, displaced left-sided rib fracture.  Alignment is normal.  Remaining visualized osseous structures are intact.  The left lung is grossly clear.  No visible pneumothorax.                                       Medications   ketorolac injection 30 mg (30 mg Intramuscular Given 3/27/23 3302)     Medical Decision Making:   History:   Old  Medical Records: I decided to obtain old medical records.  Clinical Tests:   Radiological Study: Ordered and Reviewed  ED Management:  37-year-old female presents with left posterolateral chest wall pain.  X-rays independently interpreted by me suggest 2 nondisplaced fractures of the 7th and 8th ribs.  She is given oxycodone pain.  There is no evidence of pulmonary contusion or pneumothorax.     APC / Resident Notes:   I, Dr. Wilver Espinal III, personally performed the services described in this documentation. All medical record entries made by the scribe were at my direction and in my presence.  I have reviewed the chart and agree that the record reflects my personal performance and is accurate and complete   Scribe Attestation:   Scribe #1: I performed the above scribed service and the documentation accurately describes the services I performed. I attest to the accuracy of the note.                   Clinical Impression:   Final diagnoses:  [R07.9] Chest pain  [S22.42XA] Closed fracture of multiple ribs of left side, initial encounter (Primary)        ED Disposition Condition    Discharge Stable          ED Prescriptions       Medication Sig Dispense Start Date End Date Auth. Provider    oxyCODONE-acetaminophen (PERCOCET) 5-325 mg per tablet Take 1-2 tablets by mouth every 4 (four) hours as needed for Pain. 20 tablet 3/27/2023 -- Wilver Espinal III, MD          Follow-up Information       Follow up With Specialties Details Why Contact Info    Charlie Conway MD Obstetrics In 1 week  [address]  [place] [address]  [phone]               Wilver Espinal III, MD  03/27/23 5985

## 2023-03-28 NOTE — ED NOTES
At D/C, Yael Mills is AA & O x 3, her skin is warm and dry, no adverse reaction medications if given in ED, to follow up care discussed at length with patient/family to include medications and to follow-up with MD; patient/family given discharge instructions along with prescriptions, as indicated, and care sheets.

## 2023-09-18 ENCOUNTER — HOSPITAL ENCOUNTER (EMERGENCY)
Facility: HOSPITAL | Age: 38
Discharge: HOME | End: 2023-09-18
Attending: EMERGENCY MEDICINE

## 2023-09-18 VITALS
SYSTOLIC BLOOD PRESSURE: 101 MMHG | OXYGEN SATURATION: 96 % | WEIGHT: 140 LBS | RESPIRATION RATE: 16 BRPM | TEMPERATURE: 98 F | HEART RATE: 70 BPM | BODY MASS INDEX: 27.48 KG/M2 | DIASTOLIC BLOOD PRESSURE: 69 MMHG | HEIGHT: 60 IN

## 2023-09-18 DIAGNOSIS — T14.90XA INJURY: ICD-10-CM

## 2023-09-18 DIAGNOSIS — R07.81 RIB PAIN: Primary | ICD-10-CM

## 2023-09-18 LAB
B-HCG UR QL: NEGATIVE
CTP QC/QA: YES

## 2023-09-18 PROCEDURE — 81025 URINE PREGNANCY TEST: CPT | Performed by: EMERGENCY MEDICINE

## 2023-09-18 PROCEDURE — 96372 THER/PROPH/DIAG INJ SC/IM: CPT | Performed by: EMERGENCY MEDICINE

## 2023-09-18 PROCEDURE — 99284 EMERGENCY DEPT VISIT MOD MDM: CPT | Mod: 25

## 2023-09-18 PROCEDURE — 63600175 PHARM REV CODE 636 W HCPCS: Performed by: EMERGENCY MEDICINE

## 2023-09-18 RX ORDER — KETOROLAC TROMETHAMINE 30 MG/ML
15 INJECTION, SOLUTION INTRAMUSCULAR; INTRAVENOUS
Status: COMPLETED | OUTPATIENT
Start: 2023-09-18 | End: 2023-09-18

## 2023-09-18 RX ORDER — BENZONATATE 100 MG/1
100 CAPSULE ORAL 3 TIMES DAILY PRN
Qty: 20 CAPSULE | Refills: 0 | Status: SHIPPED | OUTPATIENT
Start: 2023-09-18 | End: 2023-09-28

## 2023-09-18 RX ORDER — DICLOFENAC SODIUM 50 MG/1
50 TABLET, DELAYED RELEASE ORAL 3 TIMES DAILY PRN
Qty: 15 TABLET | Refills: 0 | Status: SHIPPED | OUTPATIENT
Start: 2023-09-18

## 2023-09-18 RX ADMIN — KETOROLAC TROMETHAMINE 15 MG: 30 INJECTION, SOLUTION INTRAMUSCULAR at 06:09

## 2023-09-18 NOTE — ED PROVIDER NOTES
Encounter Date: 2023       History     Chief Complaint   Patient presents with    Rib Injury     Pt states she feels like she re- injured ribs on left side  from previous break      This patient presents the emergency department complaining of excessive coughing.  Patient apparently injured her left-sided ribs about 4 months ago and recently she is been coughing quite a bit and feels like she aggravated the area.  She has no other complaints of at the present time.  Patient's cough is nonproductive.    The history is provided by the patient.     Review of patient's allergies indicates:   Allergen Reactions    Milk containing products (dairy)      No past medical history on file.  Past Surgical History:   Procedure Laterality Date     SECTION       Family History   Problem Relation Age of Onset    Breast cancer Sister     Colon cancer Paternal Uncle     Colon cancer Paternal Grandmother      Social History     Tobacco Use    Smoking status: Every Day     Current packs/day: 0.50     Average packs/day: 0.5 packs/day for 15.0 years (7.5 ttl pk-yrs)     Types: Cigarettes    Smokeless tobacco: Never   Substance Use Topics    Alcohol use: No    Drug use: No     Review of Systems   Constitutional: Negative.    HENT: Negative.     Eyes: Negative.    Respiratory:  Positive for cough.    Cardiovascular: Negative.    Gastrointestinal: Negative.    Endocrine: Negative.    Genitourinary: Negative.    Musculoskeletal: Negative.    Skin: Negative.    Allergic/Immunologic: Negative.    Neurological: Negative.    Hematological: Negative.    Psychiatric/Behavioral: Negative.     All other systems reviewed and are negative.      Physical Exam     Initial Vitals [23 0516]   BP Pulse Resp Temp SpO2   112/63 89 16 97.7 °F (36.5 °C) 99 %      MAP       --         Physical Exam    Nursing note and vitals reviewed.  Constitutional: Vital signs are normal. She appears well-developed. She is active and cooperative.   HENT:    Head: Normocephalic and atraumatic.   Eyes: Conjunctivae, EOM and lids are normal. Pupils are equal, round, and reactive to light.   Neck: Trachea normal. Neck supple. No thyroid mass present.    Full passive range of motion without pain.     Cardiovascular:  Normal rate, regular rhythm, S1 normal, S2 normal, normal heart sounds, intact distal pulses and normal pulses.           Pulmonary/Chest:   Right breast exhibits no tenderness.   Abdominal: Abdomen is soft. Bowel sounds are normal.   Musculoskeletal:         General: Normal range of motion.      Cervical back: Full passive range of motion without pain and neck supple.     Lymphadenopathy:     She has no axillary adenopathy.   Neurological: She is alert and oriented to person, place, and time.   Skin: Skin is warm, dry and intact.   Psychiatric: She has a normal mood and affect. Her speech is normal and behavior is normal. Judgment and thought content normal. Cognition and memory are normal.         ED Course   Procedures  Labs Reviewed   POCT URINE PREGNANCY          Imaging Results              X-Ray Chest AP Portable (Final result)  Result time 09/18/23 07:50:20      Final result by Anahi Woodward MD (09/18/23 07:50:20)                   Impression:      No acute cardiopulmonary abnormality.      Electronically signed by: Anahi Woodward  Date:    09/18/2023  Time:    07:50               Narrative:    EXAMINATION:  XR CHEST AP PORTABLE    CLINICAL HISTORY:  Injury;    TECHNIQUE:  Single view of the chest was obtained.    COMPARISON:  Chest radiograph 04/01/2022 and 03/27/2023 rib radiographs    FINDINGS:  Normal cardiomediastinal contour. No focal consolidation, pleural effusion or pneumothorax. Bibasilar atelectasis.                                       X-Ray Ribs 2 View Left (Final result)  Result time 09/18/23 07:52:06      Final result by Anahi Woodward MD (09/18/23 07:52:06)                   Impression:      Probable healing fracture of the  anterolateral left 10th rib.      Electronically signed by: Anahi Woodward  Date:    09/18/2023  Time:    07:52               Narrative:    EXAMINATION:  XR RIBS 2 VIEW LEFT    CLINICAL HISTORY:  Injury, unspecified, initial encounter    TECHNIQUE:  Two views of the left ribs were performed.    COMPARISON:  Left rib radiographs 03/27/2023    FINDINGS:  Probable healing fracture of the anterolateral left 10th rib.  No acute, displaced fracture.  Left basilar atelectasis.  No pneumothorax.                                       Medications   ketorolac injection 15 mg (15 mg Intramuscular Given 9/18/23 0628)     Medical Decision Making  Amount and/or Complexity of Data Reviewed  Labs: ordered.  Radiology: ordered.    Risk  Prescription drug management.               ED Course as of 09/18/23 1901   Mon Sep 18, 2023   0816 The patient was handed over to me at the change of shift by Dr. Wong pending x-ray results.  The patient's x-rays show a healing left-sided rib fracture without other acute abnormalities per Radiology.  The patient is stable for discharge to home.  She will be discharged home with p.o. diclofenac and p.o. Tessalon Perles.  She is referred to primary care for follow-up. [PE]      ED Course User Index  [PE] Donald Coats MD                    Clinical Impression:   Final diagnoses:  [T14.90XA] Injury  [R07.81] Rib pain (Primary)        ED Disposition Condition    Discharge Stable          ED Prescriptions       Medication Sig Dispense Start Date End Date Auth. Provider    diclofenac (VOLTAREN) 50 MG EC tablet Take 1 tablet (50 mg total) by mouth 3 (three) times daily as needed (pain). 15 tablet 9/18/2023 -- Donald Coats MD    benzonatate (TESSALON) 100 MG capsule Take 1 capsule (100 mg total) by mouth 3 (three) times daily as needed for Cough. 20 capsule 9/18/2023 9/28/2023 Donald Coats MD          Follow-up Information       Follow up With Specialties Details Why Contact Info     Charlie Conway MD Obstetrics   [address]  [place] [address]  [phone]               Angus Wong MD  09/18/23 2420

## 2023-09-19 ENCOUNTER — PATIENT OUTREACH (OUTPATIENT)
Dept: EMERGENCY MEDICINE | Facility: HOSPITAL | Age: 38
End: 2023-09-19

## 2023-09-22 NOTE — PROGRESS NOTES
ED Navigator attempted to contact patient on 3 or more separate occasions, patient is unable to reach. ED Navigator to close encounter at this time.     Sofia Forbes  ED Navigator